# Patient Record
Sex: FEMALE | Race: WHITE | NOT HISPANIC OR LATINO | Employment: OTHER | URBAN - METROPOLITAN AREA
[De-identification: names, ages, dates, MRNs, and addresses within clinical notes are randomized per-mention and may not be internally consistent; named-entity substitution may affect disease eponyms.]

---

## 2022-09-12 ENCOUNTER — APPOINTMENT (OUTPATIENT)
Dept: CT IMAGING | Facility: HOSPITAL | Age: 77
End: 2022-09-12

## 2022-09-12 ENCOUNTER — HOSPITAL ENCOUNTER (EMERGENCY)
Facility: HOSPITAL | Age: 77
Discharge: ANOTHER HOSPITAL | End: 2022-09-13
Attending: EMERGENCY MEDICINE | Admitting: EMERGENCY MEDICINE

## 2022-09-12 ENCOUNTER — APPOINTMENT (OUTPATIENT)
Dept: GENERAL RADIOLOGY | Facility: HOSPITAL | Age: 77
End: 2022-09-12

## 2022-09-12 DIAGNOSIS — R46.89 AGGRESSIVE BEHAVIOR: Primary | ICD-10-CM

## 2022-09-12 LAB
ALBUMIN SERPL-MCNC: 3.1 G/DL (ref 3.5–5.2)
ALBUMIN/GLOB SERPL: 1.2 G/DL
ALP SERPL-CCNC: 80 U/L (ref 39–117)
ALT SERPL W P-5'-P-CCNC: 16 U/L (ref 1–33)
AMPHET+METHAMPHET UR QL: NEGATIVE
ANION GAP SERPL CALCULATED.3IONS-SCNC: 13.3 MMOL/L (ref 5–15)
AST SERPL-CCNC: 26 U/L (ref 1–32)
BACTERIA UR QL AUTO: ABNORMAL /HPF
BARBITURATES UR QL SCN: NEGATIVE
BASOPHILS # BLD AUTO: 0.05 10*3/MM3 (ref 0–0.2)
BASOPHILS NFR BLD AUTO: 0.4 % (ref 0–1.5)
BENZODIAZ UR QL SCN: NEGATIVE
BILIRUB SERPL-MCNC: 0.5 MG/DL (ref 0–1.2)
BILIRUB UR QL STRIP: NEGATIVE
BUN SERPL-MCNC: 10 MG/DL (ref 8–23)
BUN/CREAT SERPL: 20.8 (ref 7–25)
CALCIUM SPEC-SCNC: 7.2 MG/DL (ref 8.6–10.5)
CANNABINOIDS SERPL QL: NEGATIVE
CHLORIDE SERPL-SCNC: 111 MMOL/L (ref 98–107)
CLARITY UR: CLEAR
CO2 SERPL-SCNC: 17.7 MMOL/L (ref 22–29)
COCAINE UR QL: NEGATIVE
COLOR UR: YELLOW
CREAT SERPL-MCNC: 0.48 MG/DL (ref 0.57–1)
D-LACTATE SERPL-SCNC: 1.8 MMOL/L (ref 0.5–2)
DEPRECATED RDW RBC AUTO: 45.7 FL (ref 37–54)
EGFRCR SERPLBLD CKD-EPI 2021: 96.5 ML/MIN/1.73
EOSINOPHIL # BLD AUTO: 0.03 10*3/MM3 (ref 0–0.4)
EOSINOPHIL NFR BLD AUTO: 0.3 % (ref 0.3–6.2)
ERYTHROCYTE [DISTWIDTH] IN BLOOD BY AUTOMATED COUNT: 14.7 % (ref 12.3–15.4)
ETHANOL BLD-MCNC: <10 MG/DL (ref 0–10)
ETHANOL UR QL: <0.01 %
GLOBULIN UR ELPH-MCNC: 2.5 GM/DL
GLUCOSE BLDC GLUCOMTR-MCNC: 105 MG/DL (ref 70–99)
GLUCOSE SERPL-MCNC: 87 MG/DL (ref 65–99)
GLUCOSE UR STRIP-MCNC: NEGATIVE MG/DL
HCT VFR BLD AUTO: 46.7 % (ref 34–46.6)
HGB BLD-MCNC: 15.2 G/DL (ref 12–15.9)
HGB UR QL STRIP.AUTO: ABNORMAL
HOLD SPECIMEN: NORMAL
HOLD SPECIMEN: NORMAL
HYALINE CASTS UR QL AUTO: ABNORMAL /LPF
IMM GRANULOCYTES # BLD AUTO: 0.04 10*3/MM3 (ref 0–0.05)
IMM GRANULOCYTES NFR BLD AUTO: 0.3 % (ref 0–0.5)
KETONES UR QL STRIP: ABNORMAL
LEUKOCYTE ESTERASE UR QL STRIP.AUTO: NEGATIVE
LYMPHOCYTES # BLD AUTO: 1.39 10*3/MM3 (ref 0.7–3.1)
LYMPHOCYTES NFR BLD AUTO: 11.8 % (ref 19.6–45.3)
MAGNESIUM SERPL-MCNC: 1.8 MG/DL (ref 1.6–2.4)
MCH RBC QN AUTO: 27.9 PG (ref 26.6–33)
MCHC RBC AUTO-ENTMCNC: 32.5 G/DL (ref 31.5–35.7)
MCV RBC AUTO: 85.8 FL (ref 79–97)
METHADONE UR QL SCN: NEGATIVE
MONOCYTES # BLD AUTO: 1.05 10*3/MM3 (ref 0.1–0.9)
MONOCYTES NFR BLD AUTO: 9 % (ref 5–12)
NEUTROPHILS NFR BLD AUTO: 78.2 % (ref 42.7–76)
NEUTROPHILS NFR BLD AUTO: 9.17 10*3/MM3 (ref 1.7–7)
NITRITE UR QL STRIP: NEGATIVE
NRBC BLD AUTO-RTO: 0 /100 WBC (ref 0–0.2)
OPIATES UR QL: NEGATIVE
OXYCODONE UR QL SCN: NEGATIVE
PH UR STRIP.AUTO: 7 [PH] (ref 5–8)
PLATELET # BLD AUTO: 301 10*3/MM3 (ref 140–450)
PMV BLD AUTO: 10.9 FL (ref 6–12)
POTASSIUM SERPL-SCNC: 3.2 MMOL/L (ref 3.5–5.2)
PROT SERPL-MCNC: 5.6 G/DL (ref 6–8.5)
PROT UR QL STRIP: NEGATIVE
RBC # BLD AUTO: 5.44 10*6/MM3 (ref 3.77–5.28)
RBC # UR STRIP: ABNORMAL /HPF
REF LAB TEST METHOD: ABNORMAL
SODIUM SERPL-SCNC: 142 MMOL/L (ref 136–145)
SP GR UR STRIP: 1.01 (ref 1–1.03)
SQUAMOUS #/AREA URNS HPF: ABNORMAL /HPF
TROPONIN T SERPL-MCNC: <0.01 NG/ML (ref 0–0.03)
UROBILINOGEN UR QL STRIP: ABNORMAL
WBC # UR STRIP: ABNORMAL /HPF
WBC NRBC COR # BLD: 11.73 10*3/MM3 (ref 3.4–10.8)
WHOLE BLOOD HOLD COAG: NORMAL
WHOLE BLOOD HOLD SPECIMEN: NORMAL

## 2022-09-12 PROCEDURE — 84484 ASSAY OF TROPONIN QUANT: CPT | Performed by: EMERGENCY MEDICINE

## 2022-09-12 PROCEDURE — 80307 DRUG TEST PRSMV CHEM ANLYZR: CPT | Performed by: EMERGENCY MEDICINE

## 2022-09-12 PROCEDURE — 93005 ELECTROCARDIOGRAM TRACING: CPT | Performed by: EMERGENCY MEDICINE

## 2022-09-12 PROCEDURE — 71045 X-RAY EXAM CHEST 1 VIEW: CPT

## 2022-09-12 PROCEDURE — 83605 ASSAY OF LACTIC ACID: CPT | Performed by: EMERGENCY MEDICINE

## 2022-09-12 PROCEDURE — 85025 COMPLETE CBC W/AUTO DIFF WBC: CPT | Performed by: EMERGENCY MEDICINE

## 2022-09-12 PROCEDURE — 82962 GLUCOSE BLOOD TEST: CPT

## 2022-09-12 PROCEDURE — 83735 ASSAY OF MAGNESIUM: CPT | Performed by: EMERGENCY MEDICINE

## 2022-09-12 PROCEDURE — 87040 BLOOD CULTURE FOR BACTERIA: CPT | Performed by: EMERGENCY MEDICINE

## 2022-09-12 PROCEDURE — 96365 THER/PROPH/DIAG IV INF INIT: CPT

## 2022-09-12 PROCEDURE — 36415 COLL VENOUS BLD VENIPUNCTURE: CPT

## 2022-09-12 PROCEDURE — 82077 ASSAY SPEC XCP UR&BREATH IA: CPT | Performed by: EMERGENCY MEDICINE

## 2022-09-12 PROCEDURE — 25010000002 LORAZEPAM PER 2 MG

## 2022-09-12 PROCEDURE — 99284 EMERGENCY DEPT VISIT MOD MDM: CPT

## 2022-09-12 PROCEDURE — 70450 CT HEAD/BRAIN W/O DYE: CPT

## 2022-09-12 PROCEDURE — 25010000002 CEFTRIAXONE PER 250 MG: Performed by: EMERGENCY MEDICINE

## 2022-09-12 PROCEDURE — 93010 ELECTROCARDIOGRAM REPORT: CPT | Performed by: INTERNAL MEDICINE

## 2022-09-12 PROCEDURE — 80053 COMPREHEN METABOLIC PANEL: CPT | Performed by: EMERGENCY MEDICINE

## 2022-09-12 PROCEDURE — 81001 URINALYSIS AUTO W/SCOPE: CPT | Performed by: EMERGENCY MEDICINE

## 2022-09-12 RX ORDER — CEFTRIAXONE SODIUM 1 G/50ML
1 INJECTION, SOLUTION INTRAVENOUS ONCE
Status: COMPLETED | OUTPATIENT
Start: 2022-09-12 | End: 2022-09-12

## 2022-09-12 RX ORDER — LORAZEPAM 2 MG/ML
1 INJECTION INTRAMUSCULAR ONCE
Status: COMPLETED | OUTPATIENT
Start: 2022-09-13 | End: 2022-09-13

## 2022-09-12 RX ORDER — SODIUM CHLORIDE 0.9 % (FLUSH) 0.9 %
10 SYRINGE (ML) INJECTION AS NEEDED
Status: DISCONTINUED | OUTPATIENT
Start: 2022-09-12 | End: 2022-09-13 | Stop reason: HOSPADM

## 2022-09-12 RX ORDER — LORAZEPAM 2 MG/ML
INJECTION INTRAMUSCULAR
Status: COMPLETED
Start: 2022-09-12 | End: 2022-09-12

## 2022-09-12 RX ADMIN — SODIUM CHLORIDE 1000 ML: 9 INJECTION, SOLUTION INTRAVENOUS at 17:30

## 2022-09-12 RX ADMIN — LORAZEPAM 1 MG: 2 INJECTION INTRAMUSCULAR; INTRAVENOUS at 15:10

## 2022-09-12 RX ADMIN — CEFTRIAXONE SODIUM 1 G: 1 INJECTION, SOLUTION INTRAVENOUS at 17:31

## 2022-09-12 NOTE — ED PROVIDER NOTES
Time: 3:20 PM EDT  Arrived by: private car  Chief Complaint: Altered Mental Status  History provided by: Family  History is limited by: mental status change     History of Present Illness:  Patient is a 77 y.o. year old female who presents to the emergency department with increasing AMS. Family notes Pt was 'going crazy' sending random texts and exhibiting erratic behavior, which is not her baseline. Family notes a similar episode happened in the past and it was linked to a UTI, Pt has had difficulty urinating as well. Family took her to an ED in [facility] today, where she was diagnosed with a UTI, states she was discharged in this condition. Pt denies any pain at present. Pt was given Ativan upon arrival to this ED.    Altered Mental Status  Presenting symptoms: behavior changes and combativeness    Most recent episode:  Today  Episode history:  Single  Timing:  Intermittent  Progression:  Partially resolved  Chronicity:  New  Context: recent infection (UTI)    Associated symptoms: no abdominal pain, no fever, no headaches, no nausea, no palpitations, no seizures and no vomiting    Associated symptoms comment:  Difficulty urinating      Similar Symptoms Previously: Yes  Recently seen: Yes, today at another ED      Patient Care Team  Primary Care Provider: Clemente Rodríguez DO    Past Medical History:     Allergies   Allergen Reactions   • Ciprofloxacin Unknown - Low Severity   • Clarithromycin Unknown - Low Severity   • Lipitor [Atorvastatin] Unknown - Low Severity   • Sulfa Antibiotics Unknown - Low Severity     No past medical history on file.  No past surgical history on file.  No family history on file.    Home Medications:  Prior to Admission medications    Not on File        Social History:          Review of Systems:  Review of Systems   Constitutional: Negative for chills and fever.   HENT: Negative for congestion, rhinorrhea and sore throat.    Eyes: Negative for pain and visual disturbance.  "  Respiratory: Negative for apnea, cough, chest tightness and shortness of breath.    Cardiovascular: Negative for chest pain and palpitations.   Gastrointestinal: Negative for abdominal pain, diarrhea, nausea and vomiting.   Genitourinary: Positive for difficulty urinating. Negative for dysuria, flank pain, hematuria and urgency.   Musculoskeletal: Negative for joint swelling and myalgias.   Skin: Negative for color change.   Neurological: Negative for seizures and headaches.        +AMS: combativeness, erratic behavior   All other systems reviewed and are negative.       Physical Exam:  /70   Pulse 83   Temp 98 °F (36.7 °C)   Resp 16   Ht 167.6 cm (66\")   Wt 60.8 kg (134 lb 0.6 oz)   SpO2 92%   BMI 21.63 kg/m²     Physical Exam  Vitals and nursing note reviewed.   Constitutional:       General: She is not in acute distress.     Appearance: Normal appearance. She is not toxic-appearing.   HENT:      Head: Normocephalic and atraumatic.      Jaw: There is normal jaw occlusion.   Eyes:      General: Lids are normal.      Extraocular Movements: Extraocular movements intact.      Conjunctiva/sclera: Conjunctivae normal.      Pupils: Pupils are equal, round, and reactive to light.   Cardiovascular:      Rate and Rhythm: Normal rate and regular rhythm.      Pulses: Normal pulses.      Heart sounds: Normal heart sounds.   Pulmonary:      Effort: Pulmonary effort is normal. No respiratory distress.      Breath sounds: Normal breath sounds. No wheezing or rhonchi.   Abdominal:      General: Abdomen is flat.      Palpations: Abdomen is soft.      Tenderness: There is no abdominal tenderness. There is no guarding or rebound.   Musculoskeletal:         General: Normal range of motion.      Cervical back: Normal range of motion and neck supple.      Right lower leg: No edema.      Left lower leg: No edema.   Skin:     General: Skin is warm and dry.   Neurological:      Mental Status: She is alert.      Comments: " AOx2  Follows all commands   Psychiatric:         Mood and Affect: Mood normal.                Medications in the Emergency Department:  Medications   sodium chloride 0.9 % flush 10 mL (has no administration in time range)   LORazepam (ATIVAN) 2 MG/ML injection  - ADS Override Pull (1 mg  Given 9/12/22 1510)   sodium chloride 0.9 % bolus 1,000 mL (1,000 mL Intravenous New Bag 9/12/22 1730)   cefTRIAXone (ROCEPHIN) IVPB 1 g (1 g Intravenous New Bag 9/12/22 1731)        Labs  Lab Results (last 24 hours)     Procedure Component Value Units Date/Time    Influenza Antigen, Rapid - , [[id number]] Collected: 09/12/22 0945     Updated: 09/12/22 1552    COVID-19, ABBOTT IN-HOUSE,NASAL Swab (NO TRANSPORT MEDIA) 2 HR TAT - , [[id number]] Collected: 09/12/22 0945     Updated: 09/12/22 1552    Urinalysis With Culture If Indicated - [[id number]]  (Abnormal) Collected: 09/12/22 0951     Updated: 09/12/22 1552    Urinalysis, Microscopic Only - [[id number]]  (Abnormal) Collected: 09/12/22 0951     Updated: 09/12/22 1552    COMPREHENSIVE METABOLIC PANEL [[id number]]  (Abnormal) Collected: 09/12/22 1008     Updated: 09/12/22 1552    C-REACTIVE PROTEIN [[id number]] Collected: 09/12/22 1008     Updated: 09/12/22 1552    CBC AND DIFFERENTIAL [[id number]]  (Abnormal) Collected: 09/12/22 1008     Updated: 09/12/22 1552    LACTIC ACID, PLASMA [[id number]] Collected: 09/12/22 1008     Updated: 09/12/22 1552    POC Glucose Once [[id number]]  (Abnormal) Collected: 09/12/22 1448    Specimen: Blood Updated: 09/12/22 1449     Glucose 105 mg/dL      Comment: Serial Number: [id number]:  [id number]       CBC & Differential [[id number]]  (Abnormal) Collected: 09/12/22 1451    Specimen: Blood Updated: 09/12/22 1459    Narrative:      The following orders were created for panel order CBC & Differential.  Procedure                               Abnormality         Status                     ---------                               -----------         ------                      CBC Auto Differential[[id number]]        Abnormal            Final result                 Please view results for these tests on the individual orders.    CBC Auto Differential [[id number]]  (Abnormal) Collected: 09/12/22 1451    Specimen: Blood Updated: 09/12/22 1459     WBC 11.73 10*3/mm3      RBC 5.44 10*6/mm3      Hemoglobin 15.2 g/dL      Hematocrit 46.7 %      MCV 85.8 fL      MCH 27.9 pg      MCHC 32.5 g/dL      RDW 14.7 %      RDW-SD 45.7 fl      MPV 10.9 fL      Platelets 301 10*3/mm3      Neutrophil % 78.2 %      Lymphocyte % 11.8 %      Monocyte % 9.0 %      Eosinophil % 0.3 %      Basophil % 0.4 %      Immature Grans % 0.3 %      Neutrophils, Absolute 9.17 10*3/mm3      Lymphocytes, Absolute 1.39 10*3/mm3      Monocytes, Absolute 1.05 10*3/mm3      Eosinophils, Absolute 0.03 10*3/mm3      Basophils, Absolute 0.05 10*3/mm3      Immature Grans, Absolute 0.04 10*3/mm3      nRBC 0.0 /100 WBC     Urinalysis With Microscopic If Indicated (No Culture) - Urine, Clean Catch [[id number]]  (Abnormal) Collected: 09/12/22 1509    Specimen: Urine, Clean Catch Updated: 09/12/22 1709     Color, UA Yellow     Appearance, UA Clear     pH, UA 7.0     Specific Gravity, UA 1.009     Glucose, UA Negative     Ketones, UA 15 mg/dL (1+)     Bilirubin, UA Negative     Blood, UA Trace     Protein, UA Negative     Leuk Esterase, UA Negative     Nitrite, UA Negative     Urobilinogen, UA 0.2 E.U./dL    Urinalysis, Microscopic Only - Urine, Clean Catch [[id number]]  (Abnormal) Collected: 09/12/22 1509    Specimen: Urine, Clean Catch Updated: 09/12/22 1709     RBC, UA 3-5 /HPF      WBC, UA 0-2 /HPF      Bacteria, UA Trace /HPF      Squamous Epithelial Cells, UA 0-2 /HPF      Hyaline Casts, UA 0-2 /LPF      Methodology Automated Microscopy    Comprehensive Metabolic Panel [[id number]]  (Abnormal) Collected: 09/12/22 1518    Specimen: Blood Updated: 09/12/22 1551     Glucose 87 mg/dL      BUN 10 mg/dL      Creatinine 0.48  mg/dL      Sodium 142 mmol/L      Potassium 3.2 mmol/L      Comment: Slight hemolysis detected by analyzer. Results may be affected.        Chloride 111 mmol/L      CO2 17.7 mmol/L      Calcium 7.2 mg/dL      Total Protein 5.6 g/dL      Albumin 3.10 g/dL      ALT (SGPT) 16 U/L      AST (SGOT) 26 U/L      Comment: Slight hemolysis detected by analyzer. Results may be affected.        Alkaline Phosphatase 80 U/L      Total Bilirubin 0.5 mg/dL      Globulin 2.5 gm/dL      A/G Ratio 1.2 g/dL      BUN/Creatinine Ratio 20.8     Anion Gap 13.3 mmol/L      eGFR 96.5 mL/min/1.73      Comment: National Kidney Foundation and American Society of Nephrology (ASN) Task Force recommended calculation based on the Chronic Kidney Disease Epidemiology Collaboration (CKD-EPI) equation refit without adjustment for race.       Narrative:      GFR Normal >60  Chronic Kidney Disease <60  Kidney Failure <15      Troponin [[id number]]  (Normal) Collected: 09/12/22 1518    Specimen: Blood Updated: 09/12/22 1548     Troponin T <0.010 ng/mL     Narrative:      Troponin T Reference Range:  <= 0.03 ng/mL-   Negative for AMI  >0.03 ng/mL-     Abnormal for myocardial necrosis.  Clinicians would have to utilize clinical acumen, EKG, Troponin and serial changes to determine if it is an Acute Myocardial Infarction or myocardial injury due to an underlying chronic condition.       Results may be falsely decreased if patient taking Biotin.      Magnesium [[id number]]  (Normal) Collected: 09/12/22 1518    Specimen: Blood Updated: 09/12/22 1548     Magnesium 1.8 mg/dL     Ethanol [[id number]] Collected: 09/12/22 1518    Specimen: Blood Updated: 09/12/22 1819    Lactic Acid, Plasma [[id number]]  (Normal) Collected: 09/12/22 1729    Specimen: Blood from Arm, Right Updated: 09/12/22 1807     Lactate 1.8 mmol/L     Blood Culture - Blood, Arm, Right [[id number]] Collected: 09/12/22 1729    Specimen: Blood from Arm, Right Updated: 09/12/22 1738    Blood Culture -  Blood, Arm, Left [[id number]] Collected: 09/12/22 1729    Specimen: Blood from Arm, Left Updated: 09/12/22 1738           Imaging:  CT Head Without Contrast    Result Date: 9/12/2022  PROCEDURE: CT HEAD WO CONTRAST  COMPARISON:  None INDICATIONS: headache  PROTOCOL:   Standard imaging protocol performed    RADIATION:   DLP: 1098.2mGy*cm   MA and/or KV was adjusted to minimize radiation dose.     TECHNIQUE: Axial images of the head without intravenous contrast.  FINDINGS:  There is mild generalized atrophy.  There is an incidental cavum septum pellucidum and cavum vergae.. There is no evidence of acute intracranial hemorrhage, mass or midline shift. No extra-axial fluid collections are identified. There are no skull fractures. The visualized paranasal sinuses and mastoid air cells are grossly clear.  IMPRESSION:  No acute intracranial abnormalities are identified.  ASHLEY BELLAMY MD       Electronically Signed and Approved By: ASHLEY BELLAMY MD on 9/12/2022 at 17:54             XR Chest 1 View    Result Date: 9/12/2022  PROCEDURE: XR CHEST 1 VW  COMPARISON: None  INDICATIONS: Weak/Dizzy/AMS triage protocol  FINDINGS:   The lungs are well-expanded. The heart and pulmonary vasculature are within normal limits.  There is mild blunting of the left costophrenic angle. There are no active appearing infiltrates.  Advanced degenerative changes are present in the glenohumeral joints.  IMPRESSION:  Mild blunting of the left costophrenic angle may represent a left pleural effusion..  ASHLEY BELLAMY MD       Electronically Signed and Approved By: ASHLEY BELLAMY MD on 9/12/2022 at 15:11             XR Chest 1 View    Result Date: 9/12/2022  STUDY:   X-RAY CHEST REASON FOR EXAM:   Female, 77 years old.  DIZZINESS; Reports dizziness and near syncope at approx 0100 this morning TECHNIQUE:   Single AP portable view of the chest. COMPARISON:   None. ___________________________________ FINDINGS: Poor inspiration with some  bibasilar atelectasis.  There is no demonstrated pleural abnormality. Normal size heart.   Normal mediastinum and karl.  Normal visualized pulmonary arteries.  Normal visualized aortic arch and descending thoracic aorta. Normal visualized thoracic spine.  Normal visualized ribs, clavicles, and shoulders. There is no demonstrated abnormality of the visualized soft tissue structures of the upper abdomen. ___________________________________    Poor inspiration with some bibasilar atelectasis. Electronically Signed: Qamar Mathews MD 2022/09/12 at 9:08 CDT Reading Location ID and State: [address] / [place] Tel [phone], Service support  [phone], Fax [phone]      Procedures:  Procedures    Progress  ED Course as of 09/13/22 0321   Tue Sep 13, 2022   0122 Nursing report is that the patient has been accepted to be transferred to [facility], however this is pending then speaking with the family.  We do not have an accepting physician's name at this point as family is not answering their phone at this time for either nursing staff or the facility.  At this point disposition is unfortunately still pending and it seems unlikely the family is going to answer their phone at 3 AM.  We will have to handover patient care likely until the morning physician in hopes that the family will respond in the morning. [RP]      ED Course User Index  [RP] Escobar Reinoso MD                            Medical Decision Making:  MDM  Number of Diagnoses or Management Options  Aggressive behavior  Diagnosis management comments: The patient´s CBC was reviewed and shows no abnormalities of critical concern. Of note, there is no anemia requiring a blood transfusion and the platelet count is acceptable.  The patient´s CMP was reviewed and shows no abnormalities of critical concern. Of note, the patient´s sodium and potassium are acceptable. The patient´s liver enzymes are unremarkable. The patient´s renal function  (creatinine) is preserved. The patient has a normal anion gap.  Urinalysis shows trace bacteriuria.  CT head is negative for acute intracranial abnormalities.  I did attempt to get the patient admitted as an obvious, however it was recommended that I reach out to geriatric psych for placement.       Amount and/or Complexity of Data Reviewed  Clinical lab tests: reviewed  Tests in the radiology section of CPT®: reviewed  Independent visualization of images, tracings, or specimens: yes    Risk of Complications, Morbidity, and/or Mortality  Presenting problems: moderate  Management options: moderate    Patient Progress  Patient progress: stable       Final diagnoses:   Aggressive behavior        Disposition:  ED Disposition     None          This medical record created using voice recognition software and a virtual scribe.    Documentation assistance provided by Emily Oshea acting as scribe for Disha Nava MD. Information recorded by the scribe was done at my direction and has been verified and validated by me.          Emily Oshea  09/12/22 1538       Escobar Reinoso MD  09/13/22 5948

## 2022-09-12 NOTE — SIGNIFICANT NOTE
09/12/22 1939   Behavior WDL   Behavior WDL interactions;motor movement;X   Interactions guarded   Motor Movement agitated   Emotion Mood WDL   Emotion/Mood/Affect WDL affect;emotion mood;X   Affect affect consistent with mood   Emotion/Mood calm   Speech WDL   Speech WDL X;speech   Speech slurring of words;whisper   Perceptual State WDL   Perceptual State WDL WDL  (Pt did not report any hallucinations this date, however, pt was sleeping/drifting to sleep during assessment)   Thought Process WDL   Thought Process WDL judgment and insight;thought process   Judgment and Insight insight not appropriate to situation;judgment not appropriate to situation   Thought Process disorganized   Intellectual Performance WDL   Intellectual Performance WDL level of consciousness;intellectual performance   Intellectual Performance able to comprehend;disoriented to time   Level of Consciousness Responds to Voice   Coping/Stress   Major Change/Loss/Stressor other (see comments)  (Family reports multiple stressors, however, did not elaborate on specific stressors other than pt being very well known in her community)   Patient Personal Strengths strong support system   Sources of Support adult child(shane)   Techniques to [place] with Loss/Stress/Change none;other (see comments)  (None known)   Understanding of Condition and Treatment distorted comprehension   C-SSRS (Recent)   Q1 Wished to be Dead (Past Month) no   Q2 Suicidal Thoughts (Past Month) no   Q6 Suicide Behavior (Lifetime) no   Violence Risk   Feels Like Hurting Others no   Previous Attempt to Harm Others no     SW met with pt at bedside this date at the request of the provider. Pt was observed to be lying down with her eyes closed. Pt was aroused and then drifted back to sleep causing SW to attempt to wake pt on multiple occasions. Pt reports that she is tired and hasn't slept in days. Pt reports that she lives at home with her  and their animals. SW inquired about  today's date and pt reports that today is 9/11. SW inquired about pts 's name and pt could not recall his name this date. SW was notified after assessment that pt was provided ativan earlier this date prior to assessment due to her being combative initially.     KARON spoke with pts daughter who reports that pt has been combative all day and aggressive both physically and verbally. Pts daughter reports that this is not typical of her mother and her mother is typically very modest and proper. Pts daughter reports no known history of dementia on her mother's side of the family this date and no prior psychiatric hospitalizations. Pts daughter reports no known history or diagnosis of mental illness. Pts daughter reports her mother, the pt, has been under a lot of stress and has multiple stressors but did not elaborate this date. KARON updated provider.

## 2022-09-13 VITALS
DIASTOLIC BLOOD PRESSURE: 63 MMHG | WEIGHT: 134.04 LBS | RESPIRATION RATE: 18 BRPM | HEIGHT: 66 IN | BODY MASS INDEX: 21.54 KG/M2 | SYSTOLIC BLOOD PRESSURE: 138 MMHG | OXYGEN SATURATION: 95 % | TEMPERATURE: 98.2 F | HEART RATE: 88 BPM

## 2022-09-13 LAB — QT INTERVAL: 403 MS

## 2022-09-13 PROCEDURE — 96376 TX/PRO/DX INJ SAME DRUG ADON: CPT

## 2022-09-13 PROCEDURE — 96375 TX/PRO/DX INJ NEW DRUG ADDON: CPT

## 2022-09-13 PROCEDURE — 25010000002 LORAZEPAM PER 2 MG: Performed by: EMERGENCY MEDICINE

## 2022-09-13 RX ORDER — LORAZEPAM 2 MG/ML
1 INJECTION INTRAMUSCULAR ONCE
Status: COMPLETED | OUTPATIENT
Start: 2022-09-13 | End: 2022-09-13

## 2022-09-13 RX ADMIN — LORAZEPAM 1 MG: 2 INJECTION INTRAMUSCULAR; INTRAVENOUS at 00:13

## 2022-09-13 RX ADMIN — LORAZEPAM 1 MG: 2 INJECTION INTRAMUSCULAR; INTRAVENOUS at 05:15

## 2022-09-13 NOTE — SIGNIFICANT NOTE
09/12/22 2010   Plan   Plan KARON Brandon spoke with pt spouse who reproted that there is no POA or living will. 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 He reproted that she has been under a lot of stress lately with the school board, his hospitalization and pain from some previous surgeries. KARON explained that referrals are being sent out to psych facilities and where they were and he was hesitant to send her far away and was asking questions in an attempt to guage her mental state as to make a decision on whether she should come home. Provider updated.

## 2022-09-13 NOTE — ED NOTES
Pt wet bed; pt was cleaned and dried with baby wipes and towels. pts sheets and chucks were replaced. pts purwick was replaced with a new one too.

## 2022-09-17 LAB
BACTERIA SPEC AEROBE CULT: NORMAL
BACTERIA SPEC AEROBE CULT: NORMAL

## 2024-01-30 NOTE — SIGNIFICANT NOTE
09/12/22 2301   Plan   Final Note SW made referrals to [facility], [facility] and [facility]. Both [facility] and [facility] have denied at this time due to pts UTI. SW updated provider.      patient